# Patient Record
Sex: MALE | Race: BLACK OR AFRICAN AMERICAN | NOT HISPANIC OR LATINO | Employment: OTHER | URBAN - METROPOLITAN AREA
[De-identification: names, ages, dates, MRNs, and addresses within clinical notes are randomized per-mention and may not be internally consistent; named-entity substitution may affect disease eponyms.]

---

## 2023-04-10 ENCOUNTER — HOSPITAL ENCOUNTER (EMERGENCY)
Facility: HOSPITAL | Age: 68
Discharge: HOME | End: 2023-04-10
Attending: EMERGENCY MEDICINE
Payer: MEDICARE

## 2023-04-10 VITALS
TEMPERATURE: 99 F | BODY MASS INDEX: 32.67 KG/M2 | SYSTOLIC BLOOD PRESSURE: 175 MMHG | HEART RATE: 83 BPM | OXYGEN SATURATION: 99 % | WEIGHT: 221.25 LBS | DIASTOLIC BLOOD PRESSURE: 84 MMHG | RESPIRATION RATE: 20 BRPM

## 2023-04-10 DIAGNOSIS — R07.81 RIB PAIN ON RIGHT SIDE: ICD-10-CM

## 2023-04-10 DIAGNOSIS — S20.211A RIB CONTUSION, RIGHT, INITIAL ENCOUNTER: Primary | ICD-10-CM

## 2023-04-10 PROCEDURE — 99283 EMERGENCY DEPT VISIT LOW MDM: CPT | Mod: ER

## 2023-04-10 PROCEDURE — 25000003 PHARM REV CODE 250: Mod: ER | Performed by: REGISTERED NURSE

## 2023-04-10 PROCEDURE — 25000003 PHARM REV CODE 250: Mod: ER | Performed by: EMERGENCY MEDICINE

## 2023-04-10 RX ORDER — ONDANSETRON 4 MG/1
4 TABLET, ORALLY DISINTEGRATING ORAL
Status: COMPLETED | OUTPATIENT
Start: 2023-04-10 | End: 2023-04-10

## 2023-04-10 RX ORDER — OXYCODONE AND ACETAMINOPHEN 5; 325 MG/1; MG/1
1-2 TABLET ORAL EVERY 8 HOURS PRN
Qty: 21 TABLET | Refills: 0 | Status: SHIPPED | OUTPATIENT
Start: 2023-04-10 | End: 2023-04-17

## 2023-04-10 RX ORDER — HYDROCODONE BITARTRATE AND ACETAMINOPHEN 10; 325 MG/1; MG/1
1 TABLET ORAL
Status: COMPLETED | OUTPATIENT
Start: 2023-04-10 | End: 2023-04-10

## 2023-04-10 RX ORDER — LIDOCAINE 50 MG/G
1 PATCH TOPICAL
Status: DISCONTINUED | OUTPATIENT
Start: 2023-04-10 | End: 2023-04-10 | Stop reason: HOSPADM

## 2023-04-10 RX ADMIN — ONDANSETRON 4 MG: 4 TABLET, ORALLY DISINTEGRATING ORAL at 07:04

## 2023-04-10 RX ADMIN — HYDROCODONE BITARTRATE AND ACETAMINOPHEN 1 TABLET: 10; 325 TABLET ORAL at 07:04

## 2023-04-10 RX ADMIN — LIDOCAINE 1 PATCH: 50 PATCH CUTANEOUS at 09:04

## 2023-04-11 NOTE — ED PROVIDER NOTES
Encounter Date: 4/10/2023       History     Chief Complaint   Patient presents with    Back Pain     Right sided flank area pain after chair collapsed under patient.      67-year-old male presents emergency department with complaints of right upper back pain after falling and landing on a chair prior to arrival.  Patient denies any low back pain, chest pain, weakness, shortness of breath, neck pain, head injury or any other injuries or symptoms at this time.    The history is provided by the patient.   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  Social History     Tobacco Use    Smoking status: Never    Smokeless tobacco: Never   Substance Use Topics    Alcohol use: No    Drug use: No     Review of Systems   Constitutional:  Negative for fever.   HENT:  Negative for sore throat.    Respiratory:  Negative for shortness of breath.    Cardiovascular:  Negative for chest pain.   Gastrointestinal:  Negative for nausea.   Genitourinary:  Negative for dysuria.   Musculoskeletal:  Positive for back pain.   Skin:  Negative for rash.   Neurological:  Negative for weakness.   Hematological:  Does not bruise/bleed easily.   All other systems reviewed and are negative.    Physical Exam     Initial Vitals [04/10/23 1852]   BP Pulse Resp Temp SpO2   (!) 175/84 83 16 98.8 °F (37.1 °C) 99 %      MAP       --          Physical Exam    Constitutional: He appears well-developed and well-nourished. No distress.   HENT:   Head: Normocephalic and atraumatic.   Nose: Nose normal.   Mouth/Throat: Uvula is midline and oropharynx is clear and moist.   Eyes: Conjunctivae and EOM are normal. Pupils are equal, round, and reactive to light.   Neck: Neck supple.   Normal range of motion.  Cardiovascular:  Normal rate and regular rhythm.           Pulmonary/Chest: Effort normal and breath sounds normal. No respiratory distress. He has no decreased breath sounds. He has no wheezes. He has no rales.           Abdominal: Abdomen is soft. Bowel sounds are normal. There is no abdominal tenderness.   Musculoskeletal:         General: Normal range of motion.      Cervical back: Normal range of motion and neck supple.     Neurological: He is alert and oriented to person, place, and time. He has normal strength. GCS eye subscore is 4. GCS verbal subscore is 5. GCS motor subscore is 6.   Skin: Skin is warm and dry. Capillary refill takes less than 2 seconds. No rash noted.   Psychiatric: He has a normal mood and affect. His speech is normal and behavior is normal.       ED Course   Procedures  Labs Reviewed - No data to display       Imaging Results              X-Ray Ribs 2 View Right (Final result)  Result time 04/10/23 20:40:53      Final result by Jesús Solomon MD (04/10/23 20:40:53)                   Impression:      No acute process.  Recommend follow-up if symptoms persist.    No displaced osseous abnormality    No pneumothorax      Electronically signed by: Jesús Solomon  Date:    04/10/2023  Time:    20:40               Narrative:    EXAMINATION:  XR RIBS 2 VIEW RIGHT    CLINICAL HISTORY:  Pleurodynia    TECHNIQUE:  Two views of the right ribs were performed.    COMPARISON:  None    FINDINGS:  No rib fracture.  No pneumothorax.  Lungs are clear.  Cardiac silhouette within normal limits.  Spinal stimulator.  Ossific density near the distal  clavicle may relate to an ossicle or degenerative joint disease                                       Medications   HYDROcodone-acetaminophen  mg per tablet 1 tablet (1 tablet Oral Given 4/10/23 1948)   ondansetron disintegrating tablet 4 mg (4 mg Oral Given 4/10/23 1951)                  Patient's evaluation in the ED does not suggest any emergent or life-threatening medical conditions requiring immediate intervention beyond what was provided in the ED, and I believe patient is safe for discharge. Regardless, an unremarkable evaluation in the ED does not preclude the development or presence of a serious or life-threatening condition. As such, patient was given return instructions for any change or worsening of symptoms.              Clinical Impression:   Final diagnoses:  [R07.81] Rib pain on right side  [S20.211A] Rib contusion, right, initial encounter (Primary)        ED Disposition Condition    Discharge Stable          ED Prescriptions       Medication Sig Dispense Start Date End Date Auth. Provider    oxyCODONE-acetaminophen (PERCOCET) 5-325 mg per tablet Take 1-2 tablets by mouth every 8 (eight) hours as needed for Pain. 21 tablet 4/10/2023 4/17/2023 Weston Andrews MD          Follow-up Information       Follow up With Specialties Details Why Contact Info    James Suarez MD Internal Medicine Schedule an appointment as soon as possible for a visit in 1 week  [address] [place] [address]  PRIMARY CARE [facility] [address] [phone]      [facility] - Emergency Dept Emergency Medicine  As needed, If symptoms worsen [id number] [facility] [address]  [facility] [address] [phone]             Weston Andrews MD  04/10/23 9997

## 2023-08-07 ENCOUNTER — HOSPITAL ENCOUNTER (OUTPATIENT)
Dept: RADIOLOGY | Facility: HOSPITAL | Age: 68
Discharge: HOME | End: 2023-08-07
Attending: INTERNAL MEDICINE
Payer: MEDICARE

## 2023-08-07 DIAGNOSIS — R10.9 FLANK PAIN: ICD-10-CM

## 2023-08-07 PROCEDURE — 74176 CT RENAL STONE STUDY ABD PELVIS WO: ICD-10-PCS | Mod: 26,,, | Performed by: RADIOLOGY

## 2023-08-07 PROCEDURE — 74176 CT ABD & PELVIS W/O CONTRAST: CPT | Mod: 26,,, | Performed by: RADIOLOGY

## 2023-08-07 PROCEDURE — 74176 CT ABD & PELVIS W/O CONTRAST: CPT | Mod: TC,PO

## 2024-01-31 ENCOUNTER — HOSPITAL ENCOUNTER (OUTPATIENT)
Dept: RADIOLOGY | Facility: HOSPITAL | Age: 69
Discharge: HOME | End: 2024-01-31
Attending: INTERNAL MEDICINE
Payer: COMMERCIAL

## 2024-01-31 DIAGNOSIS — N50.812 PAIN IN LEFT TESTICLE: ICD-10-CM

## 2024-01-31 PROCEDURE — 76870 US EXAM SCROTUM: CPT | Mod: TC,PO

## 2024-01-31 PROCEDURE — 76870 US EXAM SCROTUM: CPT | Mod: 26,,, | Performed by: RADIOLOGY

## 2024-03-05 ENCOUNTER — TELEPHONE (OUTPATIENT)
Dept: SPORTS MEDICINE | Facility: CLINIC | Age: 69
End: 2024-03-05
Payer: MEDICARE

## 2024-03-06 ENCOUNTER — OFFICE VISIT (OUTPATIENT)
Dept: SPORTS MEDICINE | Facility: CLINIC | Age: 69
End: 2024-03-06
Payer: MEDICARE

## 2024-03-06 ENCOUNTER — HOSPITAL ENCOUNTER (OUTPATIENT)
Dept: RADIOLOGY | Facility: HOSPITAL | Age: 69
Discharge: HOME | End: 2024-03-06
Attending: ORTHOPAEDIC SURGERY
Payer: MEDICARE

## 2024-03-06 VITALS — HEIGHT: 69 IN | WEIGHT: 221.31 LBS | BODY MASS INDEX: 32.78 KG/M2

## 2024-03-06 DIAGNOSIS — M25.562 ACUTE PAIN OF LEFT KNEE: Primary | ICD-10-CM

## 2024-03-06 DIAGNOSIS — M25.562 ACUTE PAIN OF LEFT KNEE: ICD-10-CM

## 2024-03-06 PROCEDURE — 73564 X-RAY EXAM KNEE 4 OR MORE: CPT | Mod: 26,LT,, | Performed by: RADIOLOGY

## 2024-03-06 PROCEDURE — 73562 X-RAY EXAM OF KNEE 3: CPT | Mod: TC,PN,RT

## 2024-03-06 PROCEDURE — 73562 X-RAY EXAM OF KNEE 3: CPT | Mod: 26,59,RT, | Performed by: RADIOLOGY

## 2024-03-06 PROCEDURE — 99999 PR PBB SHADOW E&M-EST. PATIENT-LVL IV: CPT | Mod: PBBFAC,,, | Performed by: ORTHOPAEDIC SURGERY

## 2024-03-06 PROCEDURE — 99204 OFFICE O/P NEW MOD 45 MIN: CPT | Mod: S$GLB,,, | Performed by: ORTHOPAEDIC SURGERY

## 2024-03-06 NOTE — PATIENT INSTRUCTIONS
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    Acute pain of left knee Yes      Plan:  Discussed with patient exam and imaging are consistent with early osteoarthritis, medial meniscus pathology  Discussed conservative management of a steroid shot and physical therapy  Will reach out to Cardiology team to ensure safety of steroid shot today  Cardiology would like to see him first before steroid shot is given. Will follow-up after Cardio clearance.     Follow-up: after cardiologist appointment or sooner if there are any problems between now and then.    Leave Review:   Google: Leave Google Review  Healthgrades: Leave Healthgrades Review    After Hours Number: [phone]

## 2024-03-06 NOTE — PROGRESS NOTES
"      Patient ID: Michelle Young  YOB: 1955  MRN: [id number]    Chief Complaint: Pain and Swelling of the Left Knee    Referred By: Wife is a patient Katharine Young Daughter Yun Young    History of Present Illness: Michelle Young is a  68 y.o. male   Data Unavailable with a chief complaint of Pain and Swelling of the Left Knee    Patient presents today with pain and swelling in his left leg. He expresses that he was told he has a 30 % tear within his ACL on his Left Knee from when he was 37 years old which was treated non-operatively. He states that his pain has been ongoing for about 4 weeks now. He rates his pain a 3 out of 10 today. His pain started initially while walking back to his car from an [employer] game when he felt a tightness and pull along his left quad muscle/tendon area. He reports intialy swelling that went away on its own within a few days. After the swelling was gone he went on the elliptical in the morning, and getting into bed that night he felt and heard a "large loud pop" in his knee that initially did not hurt. The next morning howveer his knee was very swollen, painful and stiff. His symptoms have improved some since then with topical creams, NSAIDs, and KT tape. He was referred by his PCP and his daughter Yun who works at the [employer].     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 No pertinent family history.  Social History     Socioeconomic History    Marital status:    Tobacco Use    Smoking status: Never    Smokeless tobacco: Never   Substance and Sexual Activity    Alcohol use: No    Drug use: No    Sexual activity: Yes     Partners: Female     Medication List with Changes/Refills   Current Medications    APIXABAN 5 MG TAB    Take 5 mg by mouth 2 (two) times daily.    ASPIRIN (ECOTRIN) 81 MG EC TABLET    Take 81 mg by mouth once daily.    FLUTICASONE (FLONASE) 50 MCG/ACTUATION NASAL SPRAY    1 spray by Each Nare route once daily.    GABAPENTIN (NEURONTIN) 600 MG TABLET    Take 600 mg by mouth 2 (two) times daily.    HYDROCODONE-ACETAMINOPHEN 7.5-325MG (NORCO) 7.5-325 MG PER TABLET    Take 1 tablet by mouth every 6 (six) hours as needed. Pain    METFORMIN (GLUCOPHAGE-XR) 500 MG 24 HR TABLET    Take 2 tablets by mouth 2 (two) times daily with meals.    METHOCARBAMOL (ROBAXIN) 750 MG TAB    Take 750 mg by mouth every 8 (eight) hours as needed. Pain    METRONIDAZOLE (FLAGYL) 500 MG TABLET    Take 500 mg by mouth 3 (three) times daily.    MULTIVITAMIN ORAL    Take 1 tablet by mouth once daily.    NEBIVOLOL (BYSTOLIC) 20 MG TAB    Take 1 tablet by mouth once daily.    OMEPRAZOLE (PRILOSEC) 40 MG CAPSULE    Take 1 capsule by mouth once daily.    PROMETHAZINE (PHENERGAN) 25 MG TABLET    Take 25 mg by mouth every 4 (four) hours.    VITAMIN D 1000 UNITS TAB    Take 185 mg by mouth once daily.     Review of patient's allergies indicates:  No Known Allergies  ROS    Physical Exam:   Body mass index is 32.69 kg/m².  There were no vitals filed for this visit.   GENERAL: Well appearing, appropriate for stated age, no acute distress.  CARDIOVASCULAR: Pulses regular by peripheral palpation.  PULMONARY: Respirations are even and non-labored.  NEURO: Awake, alert, and oriented x 3.  PSYCH: Mood & affect are appropriate.  HEENT: Head is normocephalic and atraumatic.  Ortho/SPM Exam    Left  Knee Exam    Small effusion noted on exam  TTP along quad tendon and musculature, medial joint line    ROM   0-120    MMT quads 5/5 (no pain but weaker compared to right leg).     Special Tests:   Lachman +  Valgus/varus (-)  Posterior drawer (-)    Imaging:  X-ray Knee Ortho Left with Flexion  Narrative: EXAMINATION:  XR KNEE ORTHO LEFT WITH FLEXION    CLINICAL HISTORY:  . Pain in left knee    TECHNIQUE:  AP standing view of both knees, PA flexion standing views of both knees, and Merchant views of both knees were performed. A lateral view of the left knee was also performed.    COMPARISON:  None    FINDINGS:  No acute fracture.  Joint spaces maintained bilaterally.  Chondrocalcinosis noted most prevalent within the right knee lateral compartment.  Bilateral multi compartment marginal osteophyte changes.  Patellar and tibial tuberosity enthesophyte changes noted of the left knee.  No large joint effusion.  Arterial vascular calcifications present.  Impression: As above    Electronically signed by: Hilton Chamberlain MD  Date:    03/06/2024  Time:    11:07          Relevant imaging results reviewed and interpreted by me, discussed with the patient and / or family today.     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    Acute pain of left knee Yes      Plan:  Discussed with patient exam and imaging are consistent with early osteoarthritis, medial meniscus pathology  Discussed conservative management of a steroid shot and physical therapy  Will reach out to Cardiology team to ensure safety of steroid shot today  Cardiology would like to see him first before steroid shot is given. Will follow-up after Cardio clearance.     Follow-up: after cardiologist appointment or sooner if there are any problems between now and  then.    Leave Review:   Google: Leave Google Review  Healthgrades: Leave Healthgrades Review    After Hours Number: [phone]       Provider Note/Medical Decision Making:       I discussed worrisome and red flag signs and symptoms with the patient. The patient expressed understanding and agreed to alert me immediately or to go to the emergency room if they experience any of these.   Treatment plan was developed with input from the patient/family, and they expressed understanding and agreement with the plan. All questions were answered today.          Irineo Ruiz MD  Orthopaedic Surgery & Sports Medicine       Disclaimer: This note was prepared using a voice recognition system and is likely to have sound alike errors within the text.      I, Ethan Jim, acted as a scribe for Irineo Ruiz MD for the duration of this office visit.'

## 2024-03-06 NOTE — PROGRESS NOTES
Patient ID: Michelle Young  YOB: 1955  MRN: [id number]    Chief Complaint: Pain and Swelling of the Left Knee      Referred By: Wife is a patient Katharine Young Daughter Yun Young    History of Present Illness: Michelle Young is a  68 y.o. male   Data Unavailable with a chief complaint of Pain and Swelling of the Left Knee    Patient presents today with pain and swelling in his left leg. He expresses that he was told he has a 30 % tear within his ACL on his Left Knee. He states that his pain has been ongoing for about 4 weeks now. He rates his pain a 3 out of 10 today.    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 No pertinent family history.  Social History     Socioeconomic History    Marital status:    Tobacco Use    Smoking status: Never    Smokeless tobacco: Never   Substance and Sexual Activity    Alcohol use: No    Drug use: No    Sexual activity: Yes     Partners: Female     Medication List with Changes/Refills   Current Medications    APIXABAN 5 MG TAB    Take 5 mg by mouth 2 (two) times daily.    ASPIRIN (ECOTRIN) 81 MG EC TABLET    Take 81 mg by mouth once daily.    FLUTICASONE (FLONASE) 50 MCG/ACTUATION NASAL SPRAY    1 spray by Each Nare route once daily.    GABAPENTIN (NEURONTIN) 600 MG TABLET    Take 600 mg by mouth 2 (two) times daily.    HYDROCODONE-ACETAMINOPHEN 7.5-325MG (NORCO)  7.5-325 MG PER TABLET    Take 1 tablet by mouth every 6 (six) hours as needed. Pain    METFORMIN (GLUCOPHAGE-XR) 500 MG 24 HR TABLET    Take 2 tablets by mouth 2 (two) times daily with meals.    METHOCARBAMOL (ROBAXIN) 750 MG TAB    Take 750 mg by mouth every 8 (eight) hours as needed. Pain    METRONIDAZOLE (FLAGYL) 500 MG TABLET    Take 500 mg by mouth 3 (three) times daily.    MULTIVITAMIN ORAL    Take 1 tablet by mouth once daily.    NEBIVOLOL (BYSTOLIC) 20 MG TAB    Take 1 tablet by mouth once daily.    OMEPRAZOLE (PRILOSEC) 40 MG CAPSULE    Take 1 capsule by mouth once daily.    PROMETHAZINE (PHENERGAN) 25 MG TABLET    Take 25 mg by mouth every 4 (four) hours.    VITAMIN D 1000 UNITS TAB    Take 185 mg by mouth once daily.     Review of patient's allergies indicates:  No Known Allergies  ROS    Physical Exam:   Body mass index is 32.69 kg/m².  There were no vitals filed for this visit.   GENERAL: Well appearing, appropriate for stated age, no acute distress.  CARDIOVASCULAR: Pulses regular by peripheral palpation.  PULMONARY: Respirations are even and non-labored.  NEURO: Awake, alert, and oriented x 3.  PSYCH: Mood & affect are appropriate.  HEENT: Head is normocephalic and atraumatic.  Ortho/SPM Exam  ***    Imaging:    US Scrotum And Testicles  Narrative: EXAMINATION:  US SCROTUM AND TESTICLES    CLINICAL HISTORY:  Left testicular pain    TECHNIQUE:  Sonography of the scrotum and testes.    COMPARISON:  None.    FINDINGS:  Right Testicle:    *Size: 3.5 x 2.0 x 2.5 cm  *Appearance: Normal.  *Flow: Normal arterial and venous flow  *Epididymis: Normal.  *Hydrocele: Small with mild internal debris.  Small scrotal pleural present as well.  *Varicocele: None.  .    Left Testicle:    *Size: 3.7 x 2.1 x 3.2 cm  *Appearance: Normal.  *Flow: Normal arterial and venous flow  *Epididymis: Normal.  *Hydrocele: Minimal, likely physiologic  *Varicocele: None.  .    Other findings: None.  Impression: Small right-sided  hydrocele with likely physiologic left-sided hydrocele.  There is mild internal echoes noted within the right hydrocele which may be artifact versus mild debris.  Correlate clinically.    No definite evidence of epididymo-orchitis.    Electronically signed by: Hilton Chamberlain MD  Date:    01/31/2024  Time:    13:01    ***  Relevant imaging results reviewed and interpreted by me, discussed with the patient and / or family today. ***    Other Tests:     ***    There are no Patient Instructions on file for this visit.  Provider Note/Medical Decision Making: ***      I discussed worrisome and red flag signs and symptoms with the patient. The patient expressed understanding and agreed to alert me immediately or to go to the emergency room if they experience any of these.   Treatment plan was developed with input from the patient/family, and they expressed understanding and agreement with the plan. All questions were answered today.          Irineo Ruiz MD  Orthopaedic Surgery & Sports Medicine       Disclaimer: This note was prepared using a voice recognition system and is likely to have sound alike errors within the text.

## 2024-03-20 ENCOUNTER — OFFICE VISIT (OUTPATIENT)
Dept: SPORTS MEDICINE | Facility: CLINIC | Age: 69
End: 2024-03-20
Payer: MEDICARE

## 2024-03-20 VITALS — HEIGHT: 69 IN | WEIGHT: 221.31 LBS | BODY MASS INDEX: 32.78 KG/M2

## 2024-03-20 DIAGNOSIS — M25.562 ACUTE PAIN OF LEFT KNEE: Primary | ICD-10-CM

## 2024-03-20 PROCEDURE — 99999 PR PBB SHADOW E&M-EST. PATIENT-LVL III: CPT | Mod: PBBFAC,,, | Performed by: ORTHOPAEDIC SURGERY

## 2024-03-20 PROCEDURE — 20610 DRAIN/INJ JOINT/BURSA W/O US: CPT | Mod: LT,S$GLB,, | Performed by: ORTHOPAEDIC SURGERY

## 2024-03-20 PROCEDURE — 99499 UNLISTED E&M SERVICE: CPT | Mod: S$GLB,,, | Performed by: ORTHOPAEDIC SURGERY

## 2024-03-20 RX ADMIN — METHYLPREDNISOLONE ACETATE 80 MG: 80 INJECTION, SUSPENSION INTRA-ARTICULAR; INTRALESIONAL; INTRAMUSCULAR; SOFT TISSUE at 10:03

## 2024-03-21 NOTE — PROCEDURES
Large Joint Aspiration/Injection: L knee    Date/Time: 3/20/2024 10:30 AM    Performed by: Irineo Ruiz MD  Authorized by: Irineo Ruiz MD    Consent Done?:  Yes (Verbal)  Indications:  Joint swelling and pain  Site marked: the procedure site was marked    Timeout: prior to procedure the correct patient, procedure, and site was verified    Prep: patient was prepped and draped in usual sterile fashion      Local anesthesia used?: Yes    Anesthesia:  Local infiltration  Local anesthetic:  Bupivacaine 0.5% without epinephrine, lidocaine 1% without epinephrine and topical anesthetic    Details:  Needle Size:  22 G  Ultrasonic Guidance for needle placement?: No    Approach:  Superior  Location:  Knee  Site:  L knee  Medications:  80 mg methylPREDNISolone acetate 80 mg/mL  Patient tolerance:  Patient tolerated the procedure well with no immediate complications     2cc 1% lidocaine plain, 2cc 0.5% marcaine plain, 0.5cc 80mg methylprednisolone    Procedure Note:  We discussed the risk and benefits of injections, including pain, infection, bleeding, damage to adjacent structures, risk of reaction to injection. We discussed the steroid/cortisone injections will not heal the problem but mat help decrease inflammation and help with symptoms. We discussed the risk of repeated injections. The patient expressed understanding and wanted to proceed with the injection. We performed a timeout to verify the proper patient, proper procedure, and the proper site. The injection site was prepared in a sterile fashion. The patient tolerated it well and there were no complication. We did discuss with the patient that steroid injections can cause some increase in blood sugar and blood pressure for up to a week after the injection.

## 2024-03-27 RX ORDER — METHYLPREDNISOLONE ACETATE 80 MG/ML
80 INJECTION, SUSPENSION INTRA-ARTICULAR; INTRALESIONAL; INTRAMUSCULAR; SOFT TISSUE
Status: DISCONTINUED | OUTPATIENT
Start: 2024-03-20 | End: 2024-03-27 | Stop reason: HOSPADM

## 2024-05-01 ENCOUNTER — OFFICE VISIT (OUTPATIENT)
Dept: SPORTS MEDICINE | Facility: CLINIC | Age: 69
End: 2024-05-01
Payer: MEDICARE

## 2024-05-01 VITALS — BODY MASS INDEX: 32.78 KG/M2 | HEIGHT: 69 IN | WEIGHT: 221.31 LBS

## 2024-05-01 DIAGNOSIS — M76.899 QUADRICEPS TENDINITIS: ICD-10-CM

## 2024-05-01 DIAGNOSIS — M17.12 PRIMARY OSTEOARTHRITIS OF LEFT KNEE: Primary | ICD-10-CM

## 2024-05-01 DIAGNOSIS — E66.9 OBESITY WITH SERIOUS COMORBIDITY, UNSPECIFIED CLASSIFICATION, UNSPECIFIED OBESITY TYPE: ICD-10-CM

## 2024-05-01 PROCEDURE — 99999 PR PBB SHADOW E&M-EST. PATIENT-LVL III: CPT | Mod: PBBFAC,,, | Performed by: ORTHOPAEDIC SURGERY

## 2024-05-01 PROCEDURE — 3052F HG A1C>EQUAL 8.0%<EQUAL 9.0%: CPT | Mod: CPTII,S$GLB,, | Performed by: ORTHOPAEDIC SURGERY

## 2024-05-01 PROCEDURE — 1125F AMNT PAIN NOTED PAIN PRSNT: CPT | Mod: CPTII,S$GLB,, | Performed by: ORTHOPAEDIC SURGERY

## 2024-05-01 PROCEDURE — 1101F PT FALLS ASSESS-DOCD LE1/YR: CPT | Mod: CPTII,S$GLB,, | Performed by: ORTHOPAEDIC SURGERY

## 2024-05-01 PROCEDURE — 3288F FALL RISK ASSESSMENT DOCD: CPT | Mod: CPTII,S$GLB,, | Performed by: ORTHOPAEDIC SURGERY

## 2024-05-01 PROCEDURE — 1159F MED LIST DOCD IN RCRD: CPT | Mod: CPTII,S$GLB,, | Performed by: ORTHOPAEDIC SURGERY

## 2024-05-01 PROCEDURE — 99213 OFFICE O/P EST LOW 20 MIN: CPT | Mod: S$GLB,,, | Performed by: ORTHOPAEDIC SURGERY

## 2024-05-01 PROCEDURE — 3008F BODY MASS INDEX DOCD: CPT | Mod: CPTII,S$GLB,, | Performed by: ORTHOPAEDIC SURGERY

## 2024-05-01 NOTE — PATIENT INSTRUCTIONS
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    Primary osteoarthritis of left knee Yes    Quadriceps tendinitis     Obesity with serious comorbidity, unspecified classification, unspecified obesity type      Plan:  Mr. Young has had significant improvement with CSI.  He continues to have distal quadriceps pain  He will begin PT as previously ordered  If his symptoms do not improve with further treatment, we may consider an MRI to evaluate further.    Follow-up: as needed or sooner if there are any problems between now and then.    Leave Review:   Google: Leave Google Review  Healthgrades: Leave Healthgrades Review    After Hours Number: [phone]

## 2024-05-01 NOTE — PROGRESS NOTES
Patient ID: Michelle Young  YOB: 1955  MRN: [id number]    Chief Complaint: Pain of the Left Knee    Referred By: Wife is a patient Katharine Young Daughter Yun Young    History of Present Illness: Michelle Young is a  68 y.o. male Retired with a chief complaint of Pain of the Left Knee    He returns to follow up on his left knee. He experienced excellent improvement with his knee pain.  He did not attend physical therapy.  He continues to have some pain in the distal quadriceps tendon but has been able to return to working out at his local gym.  He is pleased with his progress.    Previous HPI:  He complains of left knee pain and swelling that began in early February 2024 while walking back to his car from an [employer] game.  The following morning he went on the elliptical.  That following night he had a loud painless pop in the knee.  The following morning his knee was swollen, painful and stiff.  He treated initially with topical creams, NSAIDs and KT tape.  He was diagnosed with left knee OA with likely meniscus tear.  He was referred to PT.  He returned for CSI on 3/20/24 after obtaining clearance with his cardiologist.      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  Social History     Socioeconomic History    Marital status:    Tobacco Use    Smoking status: Never    Smokeless tobacco: Never   Substance  and Sexual Activity    Alcohol use: No    Drug use: No    Sexual activity: Yes     Partners: Female     Social Determinants of Health     Financial Resource Strain: Low Risk  (2/27/2024)    [facility], [facility] and [facility]    Overall Financial Resource Strain (CARDIA)     Difficulty of Paying Living Expenses: Not hard at all   Food Insecurity: No Food Insecurity (2/27/2024)    [facility], [facility]    Hunger Vital Sign     Worried About Running Out of Food in the Last Year: Never true     Ran Out of Food in the Last Year: Never true   Transportation Needs: No Transportation Needs (2/27/2024)    [facility], [facility]    PRAPARE - Transportation     Lack of Transportation (Medical): No     Lack of Transportation (Non-Medical): No   Physical Activity: Inactive (2/27/2024)    [facility], [facility]    Exercise Vital Sign     Days of Exercise per Week: 0 days     Minutes of Exercise per Session: 0 min   Stress: No Stress Concern Present (2/27/2024)    [facility], [facility]    [employer] - Occupational Stress Questionnaire     Feeling of Stress : Only a little   Housing Stability: Unknown  (2/27/2024)    [facility] from [facility], [facility] Its [facility]idi[facility] and Affiliates    Housing Stability Vital Sign     Unable to Pay for Housing in the Last Year: No     In the last 12 months, was there a time when you did not have a steady place to sleep or slept in a shelter (including now)?: No     Medication List with Changes/Refills   Current Medications    APIXABAN 5 MG TAB    Take 5 mg by mouth 2 (two) times daily.    ASPIRIN (ECOTRIN) 81 MG EC TABLET    Take 81 mg by mouth once daily.    FLUTICASONE (FLONASE) 50 MCG/ACTUATION NASAL SPRAY    1 spray by Each Nare route once daily.    GABAPENTIN (NEURONTIN) 600 MG TABLET    Take 600 mg by mouth 2 (two) times daily.    HYDROCODONE-ACETAMINOPHEN 7.5-325MG (NORCO) 7.5-325 MG PER TABLET    Take 1 tablet by mouth every 6 (six) hours as needed. Pain    METFORMIN (GLUCOPHAGE-XR) 500 MG 24 HR TABLET    Take 2 tablets by mouth 2 (two) times daily with meals.    METHOCARBAMOL (ROBAXIN) 750 MG TAB    Take 750 mg by mouth every 8 (eight) hours as needed. Pain    METRONIDAZOLE (FLAGYL) 500 MG TABLET    Take 500 mg by mouth 3 (three) times daily.    MULTIVITAMIN ORAL    Take 1 tablet by mouth once daily.    NEBIVOLOL (BYSTOLIC) 20 MG TAB    Take 1 tablet by mouth once daily.    OMEPRAZOLE (PRILOSEC) 40 MG CAPSULE    Take 1 capsule by mouth once daily.    PROMETHAZINE (PHENERGAN) 25 MG TABLET    Take 25 mg by mouth every 4 (four) hours.    VITAMIN D 1000 UNITS TAB    Take 185 mg by mouth once daily.     Review of patient's allergies indicates:  No Known Allergies  ROS    Physical Exam:   Body mass index is 32.69 kg/m².  There were no vitals filed for this visit.   GENERAL: Well appearing, appropriate for stated age, no acute distress.  CARDIOVASCULAR: Pulses regular by peripheral palpation.  PULMONARY: Respirations are even and non-labored.  NEURO: Awake,  alert, and oriented x 3.  PSYCH: Mood & affect are appropriate.  HEENT: Head is normocephalic and atraumatic.    Left Knee Exam  Trace effusion noted on exam  Nontender to palpation  ROM 0-130  MMT quads 5/5  Special Tests:   Lachman +  Valgus/varus (-)  Posterior drawer (-)  Intact EHL, FHL, gastrocsoleus, and tibialis anterior. Sensation intact to light touch in superficial peroneal, deep peroneal, tibial, sural, and saphenous nerve distributions. Foot warm and well perfused with capillary refill of less than 2 seconds and palpable pedal pulses.     Imaging:    No new imaging today.     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    Primary osteoarthritis of left knee Yes    Quadriceps tendinitis     Obesity with serious comorbidity, unspecified classification, unspecified obesity type      Plan:  Mr. Young has had significant improvement with CSI.  He continues to have distal quadriceps pain  He will begin PT as previously ordered  If his symptoms do not improve with further treatment, we may consider an MRI to evaluate further.    Follow-up: as needed or sooner if there are any problems between now and then.    Leave Review:   Google: Leave Google Review  Healthgrades: Leave Healthgrades Review    After Hours Number: [phone]     Provider Note/Medical Decision Making:        I discussed worrisome and red flag signs and symptoms with the patient. The patient expressed understanding and agreed to alert me immediately or to go to the emergency room if they experience any of these.   Treatment plan was developed with input from the patient/family, and they expressed understanding and agreement with the plan. All questions were answered today.          Irineo Ruiz MD  Orthopaedic Surgery & Sports  Medicine       Disclaimer: This note was prepared using a voice recognition system and is likely to have sound alike errors within the text.

## 2024-05-07 ENCOUNTER — CLINICAL SUPPORT (OUTPATIENT)
Facility: HOSPITAL | Age: 69
End: 2024-05-07
Payer: MEDICARE

## 2024-05-07 DIAGNOSIS — R29.898 DECREASED STRENGTH OF LOWER EXTREMITY: Primary | ICD-10-CM

## 2024-05-07 DIAGNOSIS — R52 PAIN AGGRAVATED BY ACTIVITIES OF DAILY LIVING: ICD-10-CM

## 2024-05-07 PROCEDURE — 97161 PT EVAL LOW COMPLEX 20 MIN: CPT | Mod: PN | Performed by: PHYSICAL THERAPIST

## 2024-05-07 NOTE — PLAN OF CARE
[facility]   Physical Therapy Initial Evaluation      Name: Michelle Young  [facility] Number: [id number]    Therapy Diagnosis:   Encounter Diagnoses   Name Primary?    Decreased strength of lower extremity Yes    Pain aggravated by activities of daily living         Physician: Irineo Ruiz MD    Physician Orders: PT Eval and Treat   Medical Diagnosis from Referral: Acute pain of left knee [M25.562]   Evaluation Date: 5/7/2024  Authorization Period Expiration: 3/6/25  Plan of Care Expiration: 8/7/24  Progress Note Due: 6/7/24  Visit # / Visits authorized: 1/ 1     FOTO:  Goal: 75%  -Intake: 72%  -Status: incomplete  -Discharge: incomplete    Precautions: Standard     Time In: 1103  Time Out: 1200  Total Appointment Time (timed & untimed codes): 57 minutes    Subjective     Date of onset: over 1 month ago    History of current condition - Michelle reports: he had a small meniscus tear and had a tooth ache pain above the knee. Happened after doing the elliptical machine on a high resistance load. The elliptical got a little bit hung up. It's feeling better now, but the pain still irritates him. He does take Gabapentin and muscle relaxers that were prescribed for the back. History of 3 back surgeries due to herniated discs on the left side. L1-2. He also has a history of a partial ACL tear on the L.    Typically exercises 4-5 days per week. Hasn't been back to the gym yet.    Falls: None    Imaging: [x] Xray [] MRI [] CT: Performed on:   FINDINGS:  No acute fracture.  Joint spaces maintained bilaterally.  Chondrocalcinosis noted most prevalent within the right knee lateral compartment.  Bilateral multi compartment marginal osteophyte changes.  Patellar and tibial tuberosity enthesophyte changes noted of the left knee.  No large joint effusion.  Arterial vascular calcifications present.    Pain:  Current Minimal/10, worst 4/10, best 0/10   Location: [] Right   [x] Left:  Quad tendon  "region  Description: Tooth ache  Aggravating Factors: Activity  Easing Factors: activity avoidance, rest, "Two old goats" cream    Prior Therapy: Years ago  Social History:    Occupation: Retired  Prior Level of Function: No limitations  Current Level of Function: Limited in workout regimen. Able to cut grass with some discomfort    Patients goals: Get back to full function.     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    Medications:   Michelle has a current medication list which includes the following prescription(s): apixaban, aspirin, fluticasone propionate, gabapentin, hydrocodone-acetaminophen, metformin, methocarbamol, metronidazole, multivitamin, nebivolol, omeprazole, promethazine, and vitamin d.    Allergies:   Review of patient's allergies indicates:  No Known Allergies     Objective      Observation:   POSTURE:    GAIT:       FUNCTIONAL MOVEMENT PATTERNS  Movement Analysis Notes   Squat [x]Functional  []Dysfunctional:  []Painful  [x]Non-Painful             ,   Lower extremity reflexes:  Reflex Left Right   Patella (L2-4) 1+ 1+   Achilles (S1) 0+ 1+   Marion Negative Negative   Babinski Negative Negative   Clonus Negative Negative     ,   Lower extremity myotomes  Neuro Testing Right   Left     L2 (hip flexion) 5/5 4/5   L3 (knee extension) 5/5 4/5   L4 (ankle DF) 5/5 5/5   L5 (great toe extension) 5/5 5/5   S1 (plantarflexion) 5/5 4/5    " ,     Lower  Limb Neurodynamic testing:   Right Left   Femoral - -        RANGE OF MOTION:     Hip AROM/PROM Right Left Pain/Dysfunction with Movement Goal   Hip Flexion (120º) 100 100     Hip Extension (30º) 0 0     Hip Abduction (45º) 20 20     Hip IR (45º) 20 20     Hip ER (45º) 25 25         Knee AROM/PROM Right Left Pain/Dysfunction with Movement Goal   Knee Flexion (135º) 135 135     Knee Extension (0º) +3 0             STRENGTH:   L/E MMT Right  (spine) Left Pain/Dysfunction with Movement Goal   Hip Flexion  4+/5 4+/5  4+/5 B   Hip Extension  3-/5 3-/5  4+/5 B   Hip Abduction  3/5 3/5  4+/5 B   Knee Extension 4+/5 4-/5  5/5 B   Knee Flexion 4+/5 4+/5  5/5 B        MUSCLE LENGTH:   Muscle Tested  Right Left  Limitation Goal   Quadriceps [] Normal  [x] Limited [] Normal  [x] Limited  Normal B         Joint mobility:  Crepitus with L patellar mobility assessment      SENSATION  [x] Intact to Light Touch   [] Impaired:      PALPATION: Structures: Increased tenderness to palpation of:          Function:     Intake Outcome Measure for FOTO knee Survey    Therapist reviewed FOTO scores for Michelle Young on 5/7/2024.   FOTO documents entered into [employer] - see Media section.    Intake Score: 71%         Treatment     Total Treatment time (time-based codes) separate from Evaluation: (5) minutes     Michelle received the treatments listed below:      Michelle received therapeutic exercises to develop ROM for 5 minutes including:    Stationary bike x 5 min      Patient Education and Home Exercises     Education provided: (10) minutes  EXERCISE: Patient was educated on all the above exercise prior/during/after for proper posture, positioning, and execution for safe performance with home exercise program.   Activity Modifications:     Written Home Exercises Provided: yes.  Exercises were reviewed and Michelle was able to demonstrate them prior to the end of the session.  Michelle demonstrated good  understanding of the  education provided. See EMR under Patient Instructions for exercises provided during therapy sessions.    Assessment     Michelle is a 68 y.o. male referred to outpatient Physical Therapy with a medical diagnosis of Acute pain of left knee [M25.562] . Clinical exam is consistent with patellofemoral related pain.     Problem List:  Hip strength  Lumbar extension/rotation  Thigh muscle strength    Pt prognosis is Good  Pt will benefit from skilled outpatient Physical Therapy to address the deficits stated above and in the chart below, provide pt/family education, and to maximize pt's level of independence.     Plan of care discussed with patient: Yes  Pt's spiritual, cultural and educational needs considered and patient is agreeable to the plan of care and goals as stated below:     Anticipated Barriers for therapy: co-morbidities and chronicity of condition    Medical Necessity is demonstrated by the following   History  Co-morbidities and personal factors that may impact the plan of care [x] LOW: no personal factors / co-morbidities  [] MODERATE: 1-2 personal factors / co-morbidities  [] HIGH: 3+ personal factors / co-morbidities    Moderate / High Support Documentation: See patient medical history and objective assessment     Examination  Body Structures and Functions, activity limitations and participation restrictions that may impact the plan of care [x] LOW: addressing 1-2 elements  [] MODERATE: 3+ elements  [] HIGH: 4+ elements (please support below)    Moderate / High Support Documentation: See patient medical history and objective assessment     Clinical Presentation [x] LOW: stable  [] MODERATE: Evolving  [] HIGH: Unstable     Decision Making/ Complexity Score: low         Short Term Goals:  6 weeks Status  Date Met   PAIN: Pt will report worst pain of 2/10 in order to progress toward max functional ability and improve quality of life. [x] Progressing  [] Met  [] Not Met    FUNCTION: Patient will demonstrate  improved function as indicated by a functional score improvement of at least 5 points on FOTO. [x] Progressing  [] Met  [] Not Met    MOBILITY: Patient will improve AROM to 50% of stated goals, listed in objective measures above, in order to progress towards independence with functional activities.  [x] Progressing  [] Met  [] Not Met    STRENGTH: Patient will improve strength to 50% of stated goals, listed in objective measures above, in order to progress towards independence with functional activities. [x] Progressing  [] Met  [] Not Met    POSTURE: Patient will correct postural deviations in sitting and standing, to decrease pain and promote long term stability.  [x] Progressing  [] Met  [] Not Met    HEP: Patient will demonstrate independence with HEP in order to progress toward functional independence. [x] Progressing  [] Met  [] Not Met      Long Term Goals:  12 weeks Status Date Met   PAIN: Pt will report worst pain of 1/10 in order to progress toward max functional ability and improve quality of life [x] Progressing  [] Met  [] Not Met    FUNCTION: Patient will demonstrate improved function as indicated by a FOTO functional score improvement as listed in header. [x] Progressing  [] Met  [] Not Met    MOBILITY: Patient will improve AROM to stated goals, listed in objective measures above, in order to return to maximal functional potential and improve quality of life.  [x] Progressing  [] Met  [] Not Met    STRENGTH: Patient will improve strength to stated goals, listed in objective measures above, in order to improve functional independence and quality of life.  [x] Progressing  [] Met  [] Not Met    GAIT: Patient will demonstrate normalized gait mechanics with minimal compensation in order to return to PLOF. [x] Progressing  [] Met  [] Not Met    Patient will return to normal ADL's, IADL's, community involvement, recreational activities, and work-related activities with less than or equal to 1/10 pain and  maximal function.  [x] Progressing  [] Met  [] Not Met      Plan   Plan of care Certification: 5/7/2024 to 8/7/24.    Outpatient Physical Therapy 2 times weekly for 12 weeks to include any combination of the following interventions: virtual visits, dry needling, modalities, electrical stimulation (IFC, Pre-Mod, Attended with Functional Dry Needling), Cervical/Lumbar Traction, Gait Training, Manual Therapy, Neuromuscular Re-ed, Patient Education, Self Care, Therapeutic Exercise, and Therapeutic Activites     Jorge Nino, PT, DPT      I CERTIFY THE NEED FOR THESE SERVICES FURNISHED UNDER THIS PLAN OF TREATMENT AND WHILE UNDER MY CARE   Physician's comments:     Physician's Signature: ___________________________________________________

## 2024-05-10 ENCOUNTER — CLINICAL SUPPORT (OUTPATIENT)
Facility: HOSPITAL | Age: 69
End: 2024-05-10
Payer: MEDICARE

## 2024-05-10 DIAGNOSIS — R29.898 DECREASED STRENGTH OF LOWER EXTREMITY: Primary | ICD-10-CM

## 2024-05-10 DIAGNOSIS — R52 PAIN AGGRAVATED BY ACTIVITIES OF DAILY LIVING: ICD-10-CM

## 2024-05-10 PROCEDURE — 97110 THERAPEUTIC EXERCISES: CPT | Mod: PN | Performed by: PHYSICAL THERAPIST

## 2024-05-10 PROCEDURE — 97140 MANUAL THERAPY 1/> REGIONS: CPT | Mod: PN | Performed by: PHYSICAL THERAPIST

## 2024-05-10 PROCEDURE — 97112 NEUROMUSCULAR REEDUCATION: CPT | Mod: PN | Performed by: PHYSICAL THERAPIST

## 2024-05-10 NOTE — PROGRESS NOTES
[facility]   Physical Therapy Treatment Note      Name: Michelle Young  Clinic Number: [id number]    Therapy Diagnosis:   Encounter Diagnoses   Name Primary?    Decreased strength of lower extremity Yes    Pain aggravated by activities of daily living      Physician: Irineo Ruiz MD     Physician Orders: PT Eval and Treat   Medical Diagnosis from Referral: Acute pain of left knee [M25.562]   Evaluation Date: 5/7/2024  Authorization Period Expiration: 3/6/25  Plan of Care Expiration: 8/7/24  Progress Note Due: 6/7/24  Visit # / Visits authorized: 1/ 20     Time In: 8:29  Time Out: 0930  Total Billable Time: 53 minutes Billing reflects 1:1 direct supervision    MD follow-up:    Precautions: Standard    FOTO:  Goal: 75%  -Intake: 72%  -Status: incomplete  -Discharge: incomplete    Subjective     Pt reports: 5/10- No problems with the knees today.  He was compliant with home exercise program.  Response to previous treatment: Improving symptoms  Functional change: Gradually improving function    Pain: 0/10  Location: left knee     Objective      Objective Measures updated at progress report unless specified otherwise.    Problem List:  Hip strength  Lumbar extension/rotation  Thigh muscle strength    Treatment     Michelle received the treatments listed below:      Intervention Performed  Today Code  (see below chart) Date/Notes  5/10   Manual Therapy x MT  Thoracic manipulation   Bike x TE 10 min   Posterior pelvic tilt x NR 20x   Bridging with band x NR 3 min   Quadruped glute isolation x NR 2 x 10 ea   Modified front plank with neutral spine x NR 3 x 30s   Goblet squat x NR 3 x 10     10 minutes of Therapeutic Exercise (TE) to develop strength, endurance, ROM, and flexibility. (04063)  08 minutes of Manual therapy (MT) to improve pain and ROM. (00276)  35 minutes of Neuromuscular Re-Education (NMR)  to improve: Balance, Coordination, Kinesthetic, Sense, Proprioception, and Posture.  (99223)  00 minutes of Therapeutic Activities (TA) to improve functional performance. (71732)  Unattended Electrical Stimulation (ES) for muscle performance and/or pain modulation. (27225)  Vasopneumatic Device Therapy () for management of swelling/edema. (70218)  BFR: Blood flow restriction applied during exercise  NP: Not Performed      Patient Education and Home Exercises         Education provided:   Physical therapy diagnosis, prognosis, and plan of care.     Written Home Exercises Provided: Patient instructed to cont prior HEP.  Exercises were reviewed and Michelle was able to demonstrate them prior to the end of the session.  Michelle demonstrated good  understanding of the education provided.     See EMR under Patient Instructions for exercises provided prior visit.    Assessment     5/10 - Michelle Young tolerated PT session well with minimal  complaints of pain or discomfort. Patient continues to have impaired lumbopelvic-hip complex stability and LE strength. Objective findings are improving with motor control and symptoms.  Updated home exercises were not issued during today's visit. Michelle demonstrated good understanding of new exercises and will continue to progress at home until next follow-up.       Michelle Is progressing well towards his goals.   Pt prognosis is Good.     Pt will continue to benefit from skilled outpatient physical therapy to address the deficits listed in the problem list box on initial evaluation, provide pt/family education and to maximize pt's level of independence in the home and community environment.     Pt's spiritual, cultural and educational needs considered and pt agreeable to plan of care and goals.    Anticipated barriers to physical therapy: co-morbidities and chronicity of condition     Goals:        Short Term Goals:  6 weeks Status  Date Met   PAIN: Pt will report worst pain of 2/10 in order to progress toward max functional ability and improve quality of  life. [x] Progressing  [] Met  [] Not Met     FUNCTION: Patient will demonstrate improved function as indicated by a functional score improvement of at least 5 points on FOTO. [x] Progressing  [] Met  [] Not Met     MOBILITY: Patient will improve AROM to 50% of stated goals, listed in objective measures above, in order to progress towards independence with functional activities.  [x] Progressing  [] Met  [] Not Met     STRENGTH: Patient will improve strength to 50% of stated goals, listed in objective measures above, in order to progress towards independence with functional activities. [x] Progressing  [] Met  [] Not Met     POSTURE: Patient will correct postural deviations in sitting and standing, to decrease pain and promote long term stability.  [x] Progressing  [] Met  [] Not Met     HEP: Patient will demonstrate independence with HEP in order to progress toward functional independence. [x] Progressing  [] Met  [] Not Met        Long Term Goals:  12 weeks Status Date Met   PAIN: Pt will report worst pain of 1/10 in order to progress toward max functional ability and improve quality of life [x] Progressing  [] Met  [] Not Met     FUNCTION: Patient will demonstrate improved function as indicated by a FOTO functional score improvement as listed in header. [x] Progressing  [] Met  [] Not Met     MOBILITY: Patient will improve AROM to stated goals, listed in objective measures above, in order to return to maximal functional potential and improve quality of life.  [x] Progressing  [] Met  [] Not Met     STRENGTH: Patient will improve strength to stated goals, listed in objective measures above, in order to improve functional independence and quality of life.  [x] Progressing  [] Met  [] Not Met     GAIT: Patient will demonstrate normalized gait mechanics with minimal compensation in order to return to PLOF. [x] Progressing  [] Met  [] Not Met     Patient will return to normal ADL's, IADL's, community involvement,  recreational activities, and work-related activities with less than or equal to 1/10 pain and maximal function.  [x] Progressing  [] Met  [] Not Met          Plan     Plan of care Certification: 5/7/2024 to 8/7/24.     Continue to progress per protocol and per patient tolerance      Jorge Nino, PT

## 2024-05-15 ENCOUNTER — CLINICAL SUPPORT (OUTPATIENT)
Facility: HOSPITAL | Age: 69
End: 2024-05-15
Payer: MEDICARE

## 2024-05-15 DIAGNOSIS — R29.898 DECREASED STRENGTH OF LOWER EXTREMITY: Primary | ICD-10-CM

## 2024-05-15 DIAGNOSIS — R52 PAIN AGGRAVATED BY ACTIVITIES OF DAILY LIVING: ICD-10-CM

## 2024-05-15 PROCEDURE — 97140 MANUAL THERAPY 1/> REGIONS: CPT | Mod: PN | Performed by: PHYSICAL THERAPIST

## 2024-05-15 PROCEDURE — 97112 NEUROMUSCULAR REEDUCATION: CPT | Mod: PN | Performed by: PHYSICAL THERAPIST

## 2024-05-15 NOTE — PROGRESS NOTES
[facility]   Physical Therapy Treatment Note      Name: Michelle Young  Clinic Number: [id number]    Therapy Diagnosis:   Encounter Diagnoses   Name Primary?    Decreased strength of lower extremity Yes    Pain aggravated by activities of daily living      Physician: Irineo Ruiz MD     Physician Orders: PT Eval and Treat   Medical Diagnosis from Referral: Acute pain of left knee [M25.562]   Evaluation Date: 5/7/2024  Authorization Period Expiration: 3/6/25  Plan of Care Expiration: 8/7/24  Progress Note Due: 6/7/24  Visit # / Visits authorized: 2/ 20     Time In: 0956  Time Out: 1058  Total Billable Time: 32 minutes Billing reflects 1:1 direct supervision    MD follow-up:    Precautions: Standard    FOTO:  Goal: 75%  -Intake: 72%  -Status: incomplete  -Discharge: incomplete    Subjective     Pt reports: 5/15- knee has been feeling good. He has gotten back to working out including walking 3 miles on the treadmill. Back has been feeling good as well.  He was compliant with home exercise program.  Response to previous treatment: Improving symptoms  Functional change: Gradually improving function    Pain: 0/10  Location: left knee     Objective      Objective Measures updated at progress report unless specified otherwise.    Problem List:  Hip strength  Lumbar extension/rotation  Thigh muscle strength    Treatment     Michelle received the treatments listed below:      CPT Code Intervention Date/Notes  5/15   MT Manual Therapy  Hip manip  Thoracic manipulation   TE Bike *10 min   NR Posterior pelvic tilt 20x   NR Bridging with band 3 min   NR Quadruped glute isolation 2 x 10 ea   NR Modified front plank with neutral spine 3 x 30s   TE SL hip abduction  2 min ea   NR Goblet squat 15# 3 x 10   TE Leg press SL *105# 3 x 12   TE Knee extension DBL *65# 3 x 12   TE Hamstring curl DBL *65# 3 x 12     00 minutes of Therapeutic Exercise (TE) to develop strength, endurance, ROM, and  flexibility. (72872)  08 minutes of Manual therapy (MT) to improve pain and ROM. (57750)  24 minutes of Neuromuscular Re-Education (NMR)  to improve: Balance, Coordination, Kinesthetic, Sense, Proprioception, and Posture. (67286)  00 minutes of Therapeutic Activities (TA) to improve functional performance. (27089)  Unattended Electrical Stimulation (ES) for muscle performance and/or pain modulation. (68203)  Vasopneumatic Device Therapy () for management of swelling/edema. (25752)  BFR: Blood flow restriction applied during exercise  NP: Not Performed    * indicates that exercise was performed, not billed today since patient was not under direct one-on-one supervision     Patient Education and Home Exercises         Education provided:   Physical therapy diagnosis, prognosis, and plan of care.     Written Home Exercises Provided: Patient instructed to cont prior HEP.  Exercises were reviewed and Michelle was able to demonstrate them prior to the end of the session.  Michelle demonstrated good  understanding of the education provided.     See EMR under Patient Instructions for exercises provided prior visit.    Assessment     5/15 patient continues to have movement dysfunction of the Lumbo pelvic region tolerance to loads is improving. He would benefit from continued to work on core and lower body strengthening.     Michelle Is progressing well towards his goals.   Pt prognosis is Good.     Pt will continue to benefit from skilled outpatient physical therapy to address the deficits listed in the problem list box on initial evaluation, provide pt/family education and to maximize pt's level of independence in the home and community environment.     Pt's spiritual, cultural and educational needs considered and pt agreeable to plan of care and goals.    Anticipated barriers to physical therapy: co-morbidities and chronicity of condition     Goals:        Short Term Goals:  6 weeks Status  Date Met   PAIN: Pt will  report worst pain of 2/10 in order to progress toward max functional ability and improve quality of life. [x] Progressing  [] Met  [] Not Met     FUNCTION: Patient will demonstrate improved function as indicated by a functional score improvement of at least 5 points on FOTO. [x] Progressing  [] Met  [] Not Met     MOBILITY: Patient will improve AROM to 50% of stated goals, listed in objective measures above, in order to progress towards independence with functional activities.  [x] Progressing  [] Met  [] Not Met     STRENGTH: Patient will improve strength to 50% of stated goals, listed in objective measures above, in order to progress towards independence with functional activities. [x] Progressing  [] Met  [] Not Met     POSTURE: Patient will correct postural deviations in sitting and standing, to decrease pain and promote long term stability.  [x] Progressing  [] Met  [] Not Met     HEP: Patient will demonstrate independence with HEP in order to progress toward functional independence. [x] Progressing  [] Met  [] Not Met        Long Term Goals:  12 weeks Status Date Met   PAIN: Pt will report worst pain of 1/10 in order to progress toward max functional ability and improve quality of life [x] Progressing  [] Met  [] Not Met     FUNCTION: Patient will demonstrate improved function as indicated by a FOTO functional score improvement as listed in header. [x] Progressing  [] Met  [] Not Met     MOBILITY: Patient will improve AROM to stated goals, listed in objective measures above, in order to return to maximal functional potential and improve quality of life.  [x] Progressing  [] Met  [] Not Met     STRENGTH: Patient will improve strength to stated goals, listed in objective measures above, in order to improve functional independence and quality of life.  [x] Progressing  [] Met  [] Not Met     GAIT: Patient will demonstrate normalized gait mechanics with minimal compensation in order to return to PLOF. [x]  Progressing  [] Met  [] Not Met     Patient will return to normal ADL's, IADL's, community involvement, recreational activities, and work-related activities with less than or equal to 1/10 pain and maximal function.  [x] Progressing  [] Met  [] Not Met          Plan     Plan of care Certification: 5/7/2024 to 8/7/24.     Continue to progress per protocol and per patient tolerance      Jorge Nino, PT

## 2024-05-20 ENCOUNTER — CLINICAL SUPPORT (OUTPATIENT)
Facility: HOSPITAL | Age: 69
End: 2024-05-20
Payer: MEDICARE

## 2024-05-20 DIAGNOSIS — R29.898 DECREASED STRENGTH OF LOWER EXTREMITY: Primary | ICD-10-CM

## 2024-05-20 DIAGNOSIS — R52 PAIN AGGRAVATED BY ACTIVITIES OF DAILY LIVING: ICD-10-CM

## 2024-05-20 PROCEDURE — 97110 THERAPEUTIC EXERCISES: CPT | Mod: PN | Performed by: PHYSICAL THERAPIST

## 2024-05-20 PROCEDURE — 97112 NEUROMUSCULAR REEDUCATION: CPT | Mod: PN | Performed by: PHYSICAL THERAPIST

## 2024-05-20 NOTE — PROGRESS NOTES
[facility]   Physical Therapy Treatment Note      Name: Michelle Young  Clinic Number: [id number]    Therapy Diagnosis:   Encounter Diagnoses   Name Primary?    Decreased strength of lower extremity Yes    Pain aggravated by activities of daily living        Physician: Irineo Ruiz MD     Physician Orders: PT Eval and Treat   Medical Diagnosis from Referral: Acute pain of left knee [M25.562]   Evaluation Date: 5/7/2024  Authorization Period Expiration: 3/6/25  Plan of Care Expiration: 8/7/24  Progress Note Due: 6/7/24  Visit # / Visits authorized: 3/ 20     Time In: 0928  Time Out: 1035  Total Billable Time: 33 minutes Billing reflects 1:1 direct supervision    MD follow-up:    Precautions: Standard    FOTO:  Goal: 75%  -Intake: 72%  -Status: incomplete  -Discharge: incomplete    Subjective     Pt reports: 5/20- Back has continued to feel really good. He has been successful with gym routine  He was compliant with home exercise program.  Response to previous treatment: Improving symptoms  Functional change: Gradually improving function    Pain: 0/10  Location: left knee     Objective      Objective Measures updated at progress report unless specified otherwise.    Problem List:  Hip strength  Lumbar extension/rotation  Thigh muscle strength    Treatment     Michelle received the treatments listed below:      CPT Code Intervention Date/Notes  5/15   MT Manual Therapy Hip manip  Thoracic manipulation   TE Bike 10 min   NR Posterior pelvic tilt 20x    NR Abdominal progression (without cuff) Bracing with bent knee fallout x 2 min   NR Bridging with band 4 x 20s holds   NR Quadruped glute isolation 2 x 10 ea   NR Modified front plank with neutral spine 3 x 30s   TE Hip abduction * Standing RTB with stick 3 x 10   NR Goblet squat* 15# 3 x 10   TE Leg press SL* 105# 3 x 12   TE Knee extension DBL* 70# 3 x 12   TE Hamstring curl DBL* 70# 3 x 12     10 minutes of Therapeutic Exercise (TE) to  develop strength, endurance, ROM, and flexibility. (02831)  10 minutes of Manual therapy (MT) to improve pain and ROM. (16835)  13 minutes of Neuromuscular Re-Education (NMR)  to improve: Balance, Coordination, Kinesthetic, Sense, Proprioception, and Posture. (73681)  00 minutes of Therapeutic Activities (TA) to improve functional performance. (24918)  Unattended Electrical Stimulation (ES) for muscle performance and/or pain modulation. (13274)  Vasopneumatic Device Therapy () for management of swelling/edema. (90393)  BFR: Blood flow restriction applied during exercise  NP: Not Performed    * indicates that exercise was performed, not billed today since patient was not under direct one-on-one supervision     Patient Education and Home Exercises         Education provided:   Physical therapy diagnosis, prognosis, and plan of care.     Written Home Exercises Provided: Patient instructed to cont prior HEP.  Exercises were reviewed and Michelle was able to demonstrate them prior to the end of the session.  Michelle demonstrated good  understanding of the education provided.     See EMR under Patient Instructions for exercises provided prior visit.    Assessment     5/20- Continued lumbopelvic motor control and stability issues but improving with cuing. Very good tolerance to progressive loading.    Michelle Is progressing well towards his goals.   Pt prognosis is Good.     Pt will continue to benefit from skilled outpatient physical therapy to address the deficits listed in the problem list box on initial evaluation, provide pt/family education and to maximize pt's level of independence in the home and community environment.     Pt's spiritual, cultural and educational needs considered and pt agreeable to plan of care and goals.    Anticipated barriers to physical therapy: co-morbidities and chronicity of condition     Goals:        Short Term Goals:  6 weeks Status  Date Met   PAIN: Pt will report worst pain of 2/10  in order to progress toward max functional ability and improve quality of life. [x] Progressing  [] Met  [] Not Met     FUNCTION: Patient will demonstrate improved function as indicated by a functional score improvement of at least 5 points on FOTO. [x] Progressing  [] Met  [] Not Met     MOBILITY: Patient will improve AROM to 50% of stated goals, listed in objective measures above, in order to progress towards independence with functional activities.  [x] Progressing  [] Met  [] Not Met     STRENGTH: Patient will improve strength to 50% of stated goals, listed in objective measures above, in order to progress towards independence with functional activities. [x] Progressing  [] Met  [] Not Met     POSTURE: Patient will correct postural deviations in sitting and standing, to decrease pain and promote long term stability.  [x] Progressing  [] Met  [] Not Met     HEP: Patient will demonstrate independence with HEP in order to progress toward functional independence. [x] Progressing  [] Met  [] Not Met        Long Term Goals:  12 weeks Status Date Met   PAIN: Pt will report worst pain of 1/10 in order to progress toward max functional ability and improve quality of life [x] Progressing  [] Met  [] Not Met     FUNCTION: Patient will demonstrate improved function as indicated by a FOTO functional score improvement as listed in header. [x] Progressing  [] Met  [] Not Met     MOBILITY: Patient will improve AROM to stated goals, listed in objective measures above, in order to return to maximal functional potential and improve quality of life.  [x] Progressing  [] Met  [] Not Met     STRENGTH: Patient will improve strength to stated goals, listed in objective measures above, in order to improve functional independence and quality of life.  [x] Progressing  [] Met  [] Not Met     GAIT: Patient will demonstrate normalized gait mechanics with minimal compensation in order to return to PLOF. [x] Progressing  [] Met  [] Not Met      Patient will return to normal ADL's, IADL's, community involvement, recreational activities, and work-related activities with less than or equal to 1/10 pain and maximal function.  [x] Progressing  [] Met  [] Not Met          Plan     Plan of care Certification: 5/7/2024 to 8/7/24.     Continue to progress per protocol and per patient tolerance      Jorge Nino, PT

## 2024-05-27 ENCOUNTER — CLINICAL SUPPORT (OUTPATIENT)
Facility: HOSPITAL | Age: 69
End: 2024-05-27
Payer: MEDICARE

## 2024-05-27 DIAGNOSIS — R29.898 DECREASED STRENGTH OF LOWER EXTREMITY: Primary | ICD-10-CM

## 2024-05-27 DIAGNOSIS — R52 PAIN AGGRAVATED BY ACTIVITIES OF DAILY LIVING: ICD-10-CM

## 2024-05-27 PROCEDURE — 97530 THERAPEUTIC ACTIVITIES: CPT | Mod: PN | Performed by: PHYSICAL THERAPIST

## 2024-05-27 PROCEDURE — 97140 MANUAL THERAPY 1/> REGIONS: CPT | Mod: PN | Performed by: PHYSICAL THERAPIST

## 2024-05-27 PROCEDURE — 97112 NEUROMUSCULAR REEDUCATION: CPT | Mod: PN | Performed by: PHYSICAL THERAPIST

## 2024-05-27 PROCEDURE — 97110 THERAPEUTIC EXERCISES: CPT | Mod: PN | Performed by: PHYSICAL THERAPIST

## 2024-05-27 NOTE — PROGRESS NOTES
[facility]   Physical Therapy Treatment Note      Name: Michelle Young  Clinic Number: [id number]    Therapy Diagnosis:   Encounter Diagnoses   Name Primary?    Decreased strength of lower extremity Yes    Pain aggravated by activities of daily living        Physician: Irineo Ruiz MD     Physician Orders: PT Eval and Treat   Medical Diagnosis from Referral: Acute pain of left knee [M25.562]   Evaluation Date: 5/7/2024  Authorization Period Expiration: 3/6/25  Plan of Care Expiration: 8/7/24  Progress Note Due: 6/7/24  Visit # / Visits authorized: 3/ 20     Time In: 3:54 PM  Time Out: 4:56 PM  Total Billable Time: 56 minutes Billing reflects 1:1 direct supervision    MD follow-up:    Precautions: Standard    FOTO:  Goal: 75%  -Intake: 72%  -Status: incomplete  -Discharge: incomplete    Subjective     Pt reports: 5/27- He has been feeling really good, the back and knees have been feeling much better.  He was compliant with home exercise program.  Response to previous treatment: Improving symptoms  Functional change: Gradually improving function    Pain: 0/10  Location: left knee     Objective      Objective Measures updated at progress report unless specified otherwise.    Problem List:  Hip strength  Lumbar extension/rotation  Thigh muscle strength    Treatment     Michelle received the treatments listed below:      CPT Code Intervention Date/Notes  5/27   MT Manual Therapy Hip manip  Thoracic manipulation  Lumbar flexion mobs   TE Bike 10 min   NR Posterior pelvic tilt Feet on ball 20x     Abdominal progression (without cuff) NP   NR Bridging with band 4 x 20s holds   NR Quadruped glute isolation NP   NR Modified front plank with neutral spine 3 x 30s   TE Hip abduction  Lateral band walk GTB 3 x 10 yds   TA Goblet squat 25# 3 x 15   TA Sled pushes L2 3 X 15 YDS     14 minutes of Therapeutic Exercise (TE) to develop strength, endurance, ROM, and flexibility. (68812)  12 minutes of  Manual therapy (MT) to improve pain and ROM. (93037)  15 minutes of Neuromuscular Re-Education (NMR)  to improve: Balance, Coordination, Kinesthetic, Sense, Proprioception, and Posture. (49356)  15 minutes of Therapeutic Activities (TA) to improve functional performance. (21350)  Unattended Electrical Stimulation (ES) for muscle performance and/or pain modulation. (87135)  Vasopneumatic Device Therapy () for management of swelling/edema. (09424)  BFR: Blood flow restriction applied during exercise  NP: Not Performed    * indicates that exercise was performed, not billed today since patient was not under direct one-on-one supervision     Patient Education and Home Exercises         Education provided:   Physical therapy diagnosis, prognosis, and plan of care.     Written Home Exercises Provided: Patient instructed to cont prior HEP.  Exercises were reviewed and Michelle was able to demonstrate them prior to the end of the session.  Michelle demonstrated good  understanding of the education provided.     See EMR under Patient Instructions for exercises provided prior visit.    Assessment     5/27- Patient is doing very well with progressive loading of exercises. 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    Michelle Is progressing well towards his goals.   Pt prognosis is Good.     Pt will continue to benefit from skilled outpatient physical therapy to address the deficits listed in the problem list box on initial evaluation, provide pt/family education and to maximize pt's level of independence in the home and community environment.     Pt's spiritual, cultural and educational needs considered and pt agreeable to plan of care and goals.    Anticipated barriers to physical therapy: co-morbidities and chronicity of condition     Goals:        Short Term Goals:  6 weeks Status  Date Met   PAIN: Pt will report worst pain of 2/10 in order to progress toward max  functional ability and improve quality of life. [x] Progressing  [] Met  [] Not Met     FUNCTION: Patient will demonstrate improved function as indicated by a functional score improvement of at least 5 points on FOTO. [x] Progressing  [] Met  [] Not Met     MOBILITY: Patient will improve AROM to 50% of stated goals, listed in objective measures above, in order to progress towards independence with functional activities.  [x] Progressing  [] Met  [] Not Met     STRENGTH: Patient will improve strength to 50% of stated goals, listed in objective measures above, in order to progress towards independence with functional activities. [x] Progressing  [] Met  [] Not Met     POSTURE: Patient will correct postural deviations in sitting and standing, to decrease pain and promote long term stability.  [x] Progressing  [] Met  [] Not Met     HEP: Patient will demonstrate independence with HEP in order to progress toward functional independence. [x] Progressing  [] Met  [] Not Met        Long Term Goals:  12 weeks Status Date Met   PAIN: Pt will report worst pain of 1/10 in order to progress toward max functional ability and improve quality of life [x] Progressing  [] Met  [] Not Met     FUNCTION: Patient will demonstrate improved function as indicated by a FOTO functional score improvement as listed in header. [x] Progressing  [] Met  [] Not Met     MOBILITY: Patient will improve AROM to stated goals, listed in objective measures above, in order to return to maximal functional potential and improve quality of life.  [x] Progressing  [] Met  [] Not Met     STRENGTH: Patient will improve strength to stated goals, listed in objective measures above, in order to improve functional independence and quality of life.  [x] Progressing  [] Met  [] Not Met     GAIT: Patient will demonstrate normalized gait mechanics with minimal compensation in order to return to PLOF. [x] Progressing  [] Met  [] Not Met     Patient will return to normal  ADL's, IADL's, community involvement, recreational activities, and work-related activities with less than or equal to 1/10 pain and maximal function.  [x] Progressing  [] Met  [] Not Met          Plan     Plan of care Certification: 5/7/2024 to 8/7/24.     Continue to progress per protocol and per patient tolerance      Jorge Nino, PT

## 2024-05-29 ENCOUNTER — CLINICAL SUPPORT (OUTPATIENT)
Facility: HOSPITAL | Age: 69
End: 2024-05-29
Payer: MEDICARE

## 2024-05-29 DIAGNOSIS — R29.898 DECREASED STRENGTH OF LOWER EXTREMITY: Primary | ICD-10-CM

## 2024-05-29 DIAGNOSIS — R52 PAIN AGGRAVATED BY ACTIVITIES OF DAILY LIVING: ICD-10-CM

## 2024-05-29 PROCEDURE — 97140 MANUAL THERAPY 1/> REGIONS: CPT | Mod: KX,PN | Performed by: PHYSICAL THERAPIST

## 2024-05-29 PROCEDURE — 97112 NEUROMUSCULAR REEDUCATION: CPT | Mod: KX,PN | Performed by: PHYSICAL THERAPIST

## 2024-05-30 NOTE — PROGRESS NOTES
[facility]   Physical Therapy Treatment Note      Name: Michelle Young  Clinic Number: [id number]    Therapy Diagnosis:   Encounter Diagnoses   Name Primary?    Decreased strength of lower extremity Yes    Pain aggravated by activities of daily living        Physician: Irineo Ruiz MD     Physician Orders: PT Eval and Treat   Medical Diagnosis from Referral: Acute pain of left knee [M25.562]   Evaluation Date: 5/7/2024  Authorization Period Expiration: 3/6/25  Plan of Care Expiration: 8/7/24  Progress Note Due: 6/7/24  Visit # / Visits authorized: 5/ 20     Time In: 1002 AM  Time Out: 1105 PM  Total Billable Time: 24 minutes Billing reflects 1:1 direct supervision    MD follow-up:    Precautions: Standard    FOTO:  Goal: 75%  -Intake: 72%  -Status: incomplete  -Discharge: incomplete    Subjective     Pt reports: 5/29- Feeling okay today, somewhat tired.  He was compliant with home exercise program.  Response to previous treatment: Improving symptoms  Functional change: Gradually improving function    Pain: 0/10  Location: left knee     Objective      Objective Measures updated at progress report unless specified otherwise.    Problem List:  Hip strength  Lumbar extension/rotation  Thigh muscle strength    Treatment     Michelle received the treatments listed below:      CPT Code Intervention Date/Notes  5/29   MT Manual Therapy Hip manip  Thoracic manipulation  Lumbar flexion mobs   TE Bike* 10 min   NR Posterior pelvic tilt* Feet on ball 20x    NR Abdominal progression (without cuff) Ball crunch 2 x 10     Hook lying dead bug with ball- legs only 2 min   NR Bridging with band 4 x 40s holds   NR Modified front plank with neutral spine 3 x 30s   TE Hip abduction * Lateral band walk GTB 3 x 10 yds   TA Goblet squat* 25# 3 x 15   TA Sled pushes* L2 3 X 15 YDS   TE Knee extension DBL* 70# 3 x 12   TE Hamstring curl DBL* 70# 3 x 12     00 minutes of Therapeutic Exercise (TE) to develop  strength, endurance, ROM, and flexibility. (16743)  12 minutes of Manual therapy (MT) to improve pain and ROM. (85158)  12 minutes of Neuromuscular Re-Education (NMR)  to improve: Balance, Coordination, Kinesthetic, Sense, Proprioception, and Posture. (52305)  00 minutes of Therapeutic Activities (TA) to improve functional performance. (38028)  Unattended Electrical Stimulation (ES) for muscle performance and/or pain modulation. (76902)  Vasopneumatic Device Therapy () for management of swelling/edema. (37786)  BFR: Blood flow restriction applied during exercise  NP: Not Performed    * indicates that exercise was performed, not billed today since patient was not under direct one-on-one supervision     Patient Education and Home Exercises         Education provided:   Physical therapy diagnosis, prognosis, and plan of care.     Written Home Exercises Provided: Patient instructed to cont prior HEP.  Exercises were reviewed and Michelle was able to demonstrate them prior to the end of the session.  Michelle demonstrated good  understanding of the education provided.     See EMR under Patient Instructions for exercises provided prior visit.    Assessment     5/29- Lumbar symptoms continue to improve and tolerance to functional load is progressing very well. We discussed a plan for returning to regular exercise.    Michelle Is progressing well towards his goals.   Pt prognosis is Good.     Pt will continue to benefit from skilled outpatient physical therapy to address the deficits listed in the problem list box on initial evaluation, provide pt/family education and to maximize pt's level of independence in the home and community environment.     Pt's spiritual, cultural and educational needs considered and pt agreeable to plan of care and goals.    Anticipated barriers to physical therapy: co-morbidities and chronicity of condition     Goals:        Short Term Goals:  6 weeks Status  Date Met   PAIN: Pt will report  worst pain of 2/10 in order to progress toward max functional ability and improve quality of life. [x] Progressing  [] Met  [] Not Met     FUNCTION: Patient will demonstrate improved function as indicated by a functional score improvement of at least 5 points on FOTO. [x] Progressing  [] Met  [] Not Met     MOBILITY: Patient will improve AROM to 50% of stated goals, listed in objective measures above, in order to progress towards independence with functional activities.  [x] Progressing  [] Met  [] Not Met     STRENGTH: Patient will improve strength to 50% of stated goals, listed in objective measures above, in order to progress towards independence with functional activities. [x] Progressing  [] Met  [] Not Met     POSTURE: Patient will correct postural deviations in sitting and standing, to decrease pain and promote long term stability.  [x] Progressing  [] Met  [] Not Met     HEP: Patient will demonstrate independence with HEP in order to progress toward functional independence. [x] Progressing  [] Met  [] Not Met        Long Term Goals:  12 weeks Status Date Met   PAIN: Pt will report worst pain of 1/10 in order to progress toward max functional ability and improve quality of life [x] Progressing  [] Met  [] Not Met     FUNCTION: Patient will demonstrate improved function as indicated by a FOTO functional score improvement as listed in header. [x] Progressing  [] Met  [] Not Met     MOBILITY: Patient will improve AROM to stated goals, listed in objective measures above, in order to return to maximal functional potential and improve quality of life.  [x] Progressing  [] Met  [] Not Met     STRENGTH: Patient will improve strength to stated goals, listed in objective measures above, in order to improve functional independence and quality of life.  [x] Progressing  [] Met  [] Not Met     GAIT: Patient will demonstrate normalized gait mechanics with minimal compensation in order to return to PLOF. [x] Progressing  []  Met  [] Not Met     Patient will return to normal ADL's, IADL's, community involvement, recreational activities, and work-related activities with less than or equal to 1/10 pain and maximal function.  [x] Progressing  [] Met  [] Not Met          Plan     Plan of care Certification: 5/7/2024 to 8/7/24.     Continue to progress per protocol and per patient tolerance      Jorge Nino, PT